# Patient Record
Sex: MALE | Race: WHITE | NOT HISPANIC OR LATINO | Employment: FULL TIME | URBAN - METROPOLITAN AREA
[De-identification: names, ages, dates, MRNs, and addresses within clinical notes are randomized per-mention and may not be internally consistent; named-entity substitution may affect disease eponyms.]

---

## 2023-12-26 ENCOUNTER — LAB (OUTPATIENT)
Dept: LAB | Facility: LAB | Age: 53
End: 2023-12-26
Payer: COMMERCIAL

## 2023-12-26 DIAGNOSIS — R41.3 OTHER AMNESIA: ICD-10-CM

## 2023-12-26 DIAGNOSIS — H53.2 DIPLOPIA: ICD-10-CM

## 2023-12-26 DIAGNOSIS — R20.2 PARESTHESIA OF SKIN: ICD-10-CM

## 2023-12-26 DIAGNOSIS — G47.33 OBSTRUCTIVE SLEEP APNEA (ADULT) (PEDIATRIC): Primary | ICD-10-CM

## 2023-12-26 DIAGNOSIS — R53.1 WEAKNESS: ICD-10-CM

## 2023-12-26 LAB
CK SERPL-CCNC: 132 U/L (ref 24–195)
CRP SERPL-MCNC: 0.6 MG/DL (ref 0–2)
ERYTHROCYTE [SEDIMENTATION RATE] IN BLOOD BY WESTERGREN METHOD: 18 MM/H (ref 0–20)
TSH SERPL DL<=0.05 MIU/L-ACNC: 1.22 MIU/L (ref 0.27–4.2)
VIT B12 SERPL-MCNC: 461 PG/ML (ref 211–946)

## 2023-12-26 PROCEDURE — 85652 RBC SED RATE AUTOMATED: CPT

## 2023-12-26 PROCEDURE — 83519 RIA NONANTIBODY: CPT

## 2023-12-26 PROCEDURE — 82607 VITAMIN B-12: CPT

## 2023-12-26 PROCEDURE — 84443 ASSAY THYROID STIM HORMONE: CPT

## 2023-12-26 PROCEDURE — 82550 ASSAY OF CK (CPK): CPT

## 2023-12-26 PROCEDURE — 86235 NUCLEAR ANTIGEN ANTIBODY: CPT

## 2023-12-26 PROCEDURE — 36415 COLL VENOUS BLD VENIPUNCTURE: CPT

## 2023-12-26 PROCEDURE — 86140 C-REACTIVE PROTEIN: CPT

## 2023-12-26 PROCEDURE — 86038 ANTINUCLEAR ANTIBODIES: CPT

## 2023-12-26 PROCEDURE — 83516 IMMUNOASSAY NONANTIBODY: CPT

## 2023-12-26 PROCEDURE — 86225 DNA ANTIBODY NATIVE: CPT

## 2023-12-27 LAB
ANA PATTERN: ABNORMAL
ANA SER QL HEP2 SUBST: POSITIVE
ANA TITR SER IF: ABNORMAL {TITER}
CENTROMERE B AB SER-ACNC: <0.2 AI
CHROMATIN AB SERPL-ACNC: <0.2 AI
DSDNA AB SER-ACNC: <1 IU/ML
ENA JO1 AB SER QL IA: <0.2 AI
ENA RNP AB SER IA-ACNC: <0.2 AI
ENA SCL70 AB SER QL IA: 2.9 AI
ENA SM AB SER IA-ACNC: <0.2 AI
ENA SM+RNP AB SER QL IA: <0.2 AI
ENA SS-A AB SER IA-ACNC: <0.2 AI
ENA SS-B AB SER IA-ACNC: <0.2 AI
RIBOSOMAL P AB SER-ACNC: <0.2 AI

## 2023-12-28 LAB — ACHR BIND AB SER-SCNC: 0 NMOL/L (ref 0–0.4)

## 2023-12-29 LAB
ACHR BLOCK AB/ACHR TOTAL SFR SER: 10 % (ref 0–26)
ACHR MOD AB/ACHR TOTAL SFR SER: 0 %

## 2024-01-02 ENCOUNTER — PROCEDURE VISIT (OUTPATIENT)
Dept: SLEEP MEDICINE | Facility: HOSPITAL | Age: 54
End: 2024-01-02
Payer: COMMERCIAL

## 2024-01-02 DIAGNOSIS — G47.33 OBSTRUCTIVE SLEEP APNEA (ADULT) (PEDIATRIC): ICD-10-CM

## 2024-01-02 PROCEDURE — 95810 POLYSOM 6/> YRS 4/> PARAM: CPT | Performed by: INTERNAL MEDICINE

## 2024-01-03 VITALS
WEIGHT: 160 LBS | RESPIRATION RATE: 18 BRPM | BODY MASS INDEX: 21.2 KG/M2 | HEIGHT: 73 IN | OXYGEN SATURATION: 95 % | HEART RATE: 93 BPM

## 2024-01-03 NOTE — PROGRESS NOTES
[facility] TECH NOTE:     Patient: Ilir Bae   MRN//AGE: [id number]  1970  53 y.o.   Technologist: Calista Donis   Room: 1   Service Date: 1/3/2024        Sleep Testing Location: [facility]:     TECHNOLOGIST SLEEP STUDY PROCEDURE NOTE:   This sleep study is being conducted according to the policies and procedures outlined by the [employer] accreditation standards.  The sleep study procedure and processes involved during this appointment was explained to the patient/patient’s family, questions were answered. The patient/family verbalized understanding.      The patient is a 53 y.o. year old male scheduled for a Diagnostic PSG Split night with montage of: PSG  He arrived for his appointment.      The study that was ultimately completed was a Diagnostic PSG Split night with montage of: PSG    The full study was completed.  Patient questionnaires completed?: yes     Consents signed? yes    Initial Fall Risk Screening:     Ilir has not fallen in the last 6 months. He did not result in injury. Ilir does not have a fear of falling. He does not need assistance with sitting, standing, or walking. He does not need assistance walking in his home. He does not need assistance in an unfamiliar setting. The patient is not using an assistive device.     Brief Study observations: The patient was referred for a diagnostic study. The test was continued as a diagnostic study.      Deviation to order/protocol and reason: PSG      If PAP, which was preferred mask/pressure/mode:       Other: None    After the procedure, the patient/family was informed to ensure followup with ordering clinician for testing results.      Technologist: Calista Donis [facility]

## 2024-01-08 ENCOUNTER — ANCILLARY PROCEDURE (OUTPATIENT)
Dept: RADIOLOGY | Facility: CLINIC | Age: 54
End: 2024-01-08
Payer: COMMERCIAL

## 2024-01-08 DIAGNOSIS — H53.2 DIPLOPIA: ICD-10-CM

## 2024-01-08 DIAGNOSIS — G47.33 OBSTRUCTIVE SLEEP APNEA (ADULT) (PEDIATRIC): ICD-10-CM

## 2024-01-08 DIAGNOSIS — R20.2 PARESTHESIA OF SKIN: ICD-10-CM

## 2024-01-08 DIAGNOSIS — R53.1 WEAKNESS: ICD-10-CM

## 2024-01-08 DIAGNOSIS — R41.3 OTHER AMNESIA: ICD-10-CM

## 2024-01-08 PROCEDURE — 70551 MRI BRAIN STEM W/O DYE: CPT

## 2024-01-08 PROCEDURE — 70551 MRI BRAIN STEM W/O DYE: CPT | Performed by: RADIOLOGY

## 2024-02-16 ENCOUNTER — OFFICE VISIT (OUTPATIENT)
Dept: OTOLARYNGOLOGY | Facility: CLINIC | Age: 54
End: 2024-02-16
Payer: COMMERCIAL

## 2024-02-16 VITALS — TEMPERATURE: 97.1 F | HEIGHT: 71 IN | BODY MASS INDEX: 24 KG/M2 | WEIGHT: 171.4 LBS

## 2024-02-16 DIAGNOSIS — H81.12 BENIGN PAROXYSMAL POSITIONAL VERTIGO OF LEFT EAR: ICD-10-CM

## 2024-02-16 DIAGNOSIS — R42 DIZZINESS: ICD-10-CM

## 2024-02-16 DIAGNOSIS — J35.2 ADENOID HYPERTROPHY: ICD-10-CM

## 2024-02-16 DIAGNOSIS — J34.2 DEVIATED NASAL SEPTUM: ICD-10-CM

## 2024-02-16 DIAGNOSIS — G47.33 OBSTRUCTIVE SLEEP APNEA: Primary | ICD-10-CM

## 2024-02-16 PROCEDURE — 31231 NASAL ENDOSCOPY DX: CPT | Performed by: OTOLARYNGOLOGY

## 2024-02-16 PROCEDURE — 99204 OFFICE O/P NEW MOD 45 MIN: CPT | Performed by: OTOLARYNGOLOGY

## 2024-02-16 RX ORDER — FLUTICASONE PROPIONATE 50 MCG
SPRAY, SUSPENSION (ML) NASAL EVERY 24 HOURS
COMMUNITY
Start: 2015-04-21

## 2024-02-16 RX ORDER — HYDROCHLOROTHIAZIDE 25 MG/1
25 TABLET ORAL
COMMUNITY
Start: 2022-11-23

## 2024-02-16 RX ORDER — AMPICILLIN TRIHYDRATE 500 MG
25 CAPSULE ORAL DAILY
COMMUNITY

## 2024-02-16 RX ORDER — EZETIMIBE 10 MG/1
10 TABLET ORAL DAILY
COMMUNITY
Start: 2024-02-10

## 2024-02-16 RX ORDER — LOSARTAN POTASSIUM 100 MG/1
100 TABLET ORAL DAILY
COMMUNITY

## 2024-02-16 ASSESSMENT — PATIENT HEALTH QUESTIONNAIRE - PHQ9
SUM OF ALL RESPONSES TO PHQ9 QUESTIONS 1 & 2: 0
1. LITTLE INTEREST OR PLEASURE IN DOING THINGS: NOT AT ALL
2. FEELING DOWN, DEPRESSED OR HOPELESS: NOT AT ALL

## 2024-02-16 NOTE — PROGRESS NOTES
"HPI  Ilir Bae is a 54 y.o. male here primarily at the kind referral of Esme Moran for evaluation of incidental finding of nasopharyngeal tissue on a recent MRI for tremors and forgetfulness.  He has a history of tonsillectomy about 20 years ago.  (MRI December 2023 IMPRESSION: Prominence of the adenoids with partially visualized retropharyngeal  lymph nodes measuring 5 mm in short axis. Findings are nonspecific and may be related to recent URI, however a lymphoproliferative process can not be excluded.)    He has a separate issue of obstructive sleep apnea and is seeing Dr. Patiño.  He has not yet started treatment on this.    Lastly he has a long history of dizziness.  He has been diagnosed with BPPV in the past.  This feels like that.  Especially to the left-hand side.  No change in hearing.      Past Medical History:   Diagnosis Date    Hypertension     Sleep apnea             Medications:     Current Outpatient Medications:     cetirizine (ZYRTEC) 10 mg capsule, Take by mouth., Disp: , Rfl:     ezetimibe (Zetia) 10 mg tablet, Take 1 tablet (10 mg) by mouth once daily., Disp: , Rfl:     fluticasone (Flonase Allergy Relief) 50 mcg/actuation nasal spray, once every 24 hours., Disp: , Rfl:     hydroCHLOROthiazide (HYDRODiuril) 25 mg tablet, 1 tablet (25 mg)., Disp: , Rfl:     losartan (Cozaar) 100 mg tablet, Take 1 tablet (100 mg) by mouth once daily., Disp: , Rfl:     Vitamin D3 25 mcg (1,000 unit) capsule, Take 1 capsule (25 mcg) by mouth once daily., Disp: , Rfl:      Allergies:  Allergies   Allergen Reactions    Morphine Itching, Other and Rash        Physical Exam:  Last Recorded Vitals  Temperature 36.2 °C (97.1 °F), height 1.803 m (5' 11\"), weight 77.7 kg (171 lb 6.4 oz).  General:     General appearance: Well-developed, well-nourished in no acute distress.       Voice:  normal       Head/face: Normal appearance; nontender to palpation     Facial nerve function: Normal and symmetric " bilaterally.    Oral/oropharynx:     Oral vestibule: Normal labial and gingival mucosa     Tongue/floor of mouth: Normal without lesion     Oropharynx: Clear.  No lesions present of the hard/soft palate, posterior pharynx    Neck:     Neck: Normal appearance, trachea midline     Salivary glands: Normal to palpation bilaterally     Lymph nodes: No cervical lymphadenopathy to palpation     Thyroid: No thyromegaly.  No palpable nodules     Range of motion: Normal    Neurological:     Cortical functions: Alert and oriented x3, appropriate affect       Larynx/hypopharynx:     Laryngeal findings: Mirror exam inadequate or limited secondary to enlarged base of tongue and/or excessive gagging    Ear:     Ear canal: Normal bilaterally     Tympanic membrane: Intact and mobile bilaterally     Pinna: Normal bilaterally     Hearing:  Gross hearing assessment normal by voice  Putney-Hallpike testing with rotatory nystagmus and dizziness to the left.  Otolith repositioning maneuver performed x 2 to the left.    Nose:     Visualized using: Flexible nasal endoscopy utilized secondary to inadequate anterior rhinoscopy  Nasopharynx: Inadequate mirror examination as above, endoscopy demonstrates midline nasopharyngeal tissue.  It did not look particularly suspicious.  Mild erythema.  Possible cystic component.     Nasal dorsum: Nontraumatic midline appearance     Septum: Considerable right nasal septal deviation     Inferior turbinates: Normally sized     Mucosa: Bilateral, pink, normal appearing       Assessment/Plan   He has right nasal septal deviation and obstructive sleep apnea.  I educated him about this and presuming he starts CPAP, if he finds that the interface feels uncomfortable in the nose, there may be more role for addressing this surgically.  Regarding the nasopharyngeal findings, I have asked him to follow-up in 6 to 8 weeks for repeat exam to see how this changes or does not over time.  There may be a role for biopsy or  removal.  Regarding his BPPV, he was repositioned today and educated.  He will call if this persists to be a problem         Kvng Cancino MD

## 2024-02-28 ENCOUNTER — APPOINTMENT (OUTPATIENT)
Dept: OTOLARYNGOLOGY | Facility: CLINIC | Age: 54
End: 2024-02-28
Payer: COMMERCIAL

## 2025-07-31 ENCOUNTER — HOSPITAL ENCOUNTER (OUTPATIENT)
Dept: RADIOLOGY | Facility: CLINIC | Age: 55
End: 2025-07-31
Payer: COMMERCIAL